# Patient Record
Sex: FEMALE | Race: WHITE | Employment: STUDENT | URBAN - METROPOLITAN AREA
[De-identification: names, ages, dates, MRNs, and addresses within clinical notes are randomized per-mention and may not be internally consistent; named-entity substitution may affect disease eponyms.]

---
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
## 2024-02-06 NOTE — MR AVS SNAPSHOT
[address] [facility] [address] [phone]               Thank you for choosing us for your health care visit with Steve Lafleur MD.  We are glad to serve you and happy to provide you with this summary of your If patient has to be NPO     [address], [address] [facility]     Phone:  [phone]    - Sertraline HCl 50 MG Tabs      You can get these medications from any pharmacy     Bring a paper prescription for each of these medications    - Sertraline HCl 50 MG Tabs            MyChart     Sign up

## (undated) NOTE — MR AVS SNAPSHOT
[address]  [address]  [address] [address]  [phone]               Thank you for choosing us for your health care visit with Bel Amos PA-C.   We are glad to serve you and happy to provide you with this sum Some people also find ice packs effective for relieving pain. Medicines  Your doctor may prescribe medications to help treat your sinusitis. If you have an infection, antibiotics can help clear it up.  If you are prescribed antibiotics, take all pills on s · Wear a mask when you clean. Or consider hiring a  to help you stay away from dust.  · Sit in the nonsmoking sections of restaurants. · Don't go outdoors during peak pollution hours such as rush hour.   · Keep an air conditioner on during [place] · Other respiratory problems, such as asthma  What are the symptoms of acute bronchitis? An inflamed airway blocks airflow. Acute bronchitis usually comes on suddenly, often after a cold or flu.  Symptoms include:  · Noisy breathing or wheezing  · M Use caution when giving these medications to kids between the ages of 3 and 6 years. · Never give a child under age 25 aspirin to treat a fever unless your health care provider says it’s OK.  (It could cause a rare but serious condition called Reye’s synd · Symptoms that don’t start to improve within a week, or within 3 days of taking antibiotics  · Recurring bronchial infections  · Fever:  ¨ In an infant under 3 months old, a rectal temperature of 100.4°F (38.0°C) or higher  ¨ In a child of any age who has Betito Nelson [phone], [phone]  [id number]     Phone:  [phone]    - cefdinir 300 MG Caps            Follow-up Instructions     Return if symptoms worsen or fail to improve.          MyChart     Sign up for PHIL o cooking healthy meals together  o creating a rainbow shopping list to find colorful fruits and vegetables  o go on a walking scavenger hunt through the neighborhood   o grow a family garden    In addition to 5, 4, 3, 2, 1 families can make small changes